# Patient Record
Sex: FEMALE | Race: OTHER | Employment: UNEMPLOYED | URBAN - METROPOLITAN AREA
[De-identification: names, ages, dates, MRNs, and addresses within clinical notes are randomized per-mention and may not be internally consistent; named-entity substitution may affect disease eponyms.]

---

## 2022-10-20 ENCOUNTER — OFFICE VISIT (OUTPATIENT)
Dept: URGENT CARE | Age: 42
End: 2022-10-20

## 2022-10-20 VITALS
HEART RATE: 88 BPM | BODY MASS INDEX: 24.8 KG/M2 | WEIGHT: 140 LBS | HEIGHT: 63 IN | TEMPERATURE: 98.5 F | RESPIRATION RATE: 16 BRPM | OXYGEN SATURATION: 100 % | SYSTOLIC BLOOD PRESSURE: 140 MMHG | DIASTOLIC BLOOD PRESSURE: 83 MMHG

## 2022-10-20 DIAGNOSIS — R22.0 SWELLING OF UPPER LIP: Primary | ICD-10-CM

## 2022-10-20 PROCEDURE — 99203 OFFICE O/P NEW LOW 30 MIN: CPT | Performed by: FAMILY MEDICINE

## 2022-10-20 RX ORDER — PREDNISONE 20 MG/1
60 TABLET ORAL
Qty: 3 TABLET | Refills: 0 | Status: SHIPPED | COMMUNITY
Start: 2022-10-20 | End: 2022-10-20

## 2022-10-20 RX ORDER — PREDNISONE 5 MG/1
TABLET ORAL
Qty: 21 TABLET | Refills: 0 | Status: SHIPPED | OUTPATIENT
Start: 2022-10-21

## 2022-10-20 NOTE — PROGRESS NOTES
Edil Williamson is a 38 y.o. female who presents with upper lip swelling that started 2 hr PTA. Denies new foods, medications. Does not take medication daily. Denies SOB, throat/tongue swelling. The history is provided by the patient. History reviewed. No pertinent past medical history. History reviewed. No pertinent surgical history. History reviewed. No pertinent family history. Social History     Socioeconomic History    Marital status: SINGLE     Spouse name: Not on file    Number of children: Not on file    Years of education: Not on file    Highest education level: Not on file   Occupational History    Not on file   Tobacco Use    Smoking status: Never    Smokeless tobacco: Not on file   Substance and Sexual Activity    Alcohol use: Not on file    Drug use: Not on file    Sexual activity: Not on file   Other Topics Concern    Not on file   Social History Narrative    Not on file     Social Determinants of Health     Financial Resource Strain: Not on file   Food Insecurity: Not on file   Transportation Needs: Not on file   Physical Activity: Not on file   Stress: Not on file   Social Connections: Not on file   Intimate Partner Violence: Not on file   Housing Stability: Not on file                ALLERGIES: Patient has no known allergies. Review of Systems   Constitutional:  Negative for activity change, appetite change and fever. HENT:  Positive for facial swelling. Negative for sore throat. Respiratory:  Negative for chest tightness, shortness of breath and wheezing. Gastrointestinal:  Negative for diarrhea, nausea and vomiting. Vitals:    10/20/22 1541 10/20/22 1635   BP: (!) 166/91 (!) 140/83   Pulse: 88    Resp: 16    Temp: 98.5 °F (36.9 °C)    SpO2: 100%    Weight: 140 lb (63.5 kg)    Height: 5' 3\" (1.6 m)        Physical Exam  Vitals and nursing note reviewed. Constitutional:       General: She is not in acute distress. Appearance: She is well-developed.  She is not diaphoretic. HENT:      Mouth/Throat:      Mouth: Mucous membranes are moist.      Tongue: No lesions. Pharynx: Oropharynx is clear. No oropharyngeal exudate or posterior oropharyngeal erythema. Comments: Upper lip swelling: non-ttp    No tongue swelling  Cardiovascular:      Rate and Rhythm: Normal rate and regular rhythm. Heart sounds: Normal heart sounds. Pulmonary:      Effort: Pulmonary effort is normal. No respiratory distress. Breath sounds: Normal breath sounds. No stridor. No wheezing, rhonchi or rales. Neurological:      Mental Status: She is alert. Psychiatric:         Behavior: Behavior normal.         Thought Content: Thought content normal.         Judgment: Judgment normal.       MDM    ICD-10-CM ICD-9-CM   1. Swelling of upper lip  R22.0 784.2       Orders Placed This Encounter    predniSONE (DELTASONE) 20 mg tablet     Sig: Take 3 Tablets by mouth now for 1 dose. Dispense:  3 Tablet     Refill:  0     Order Specific Question:   Expiration Date     Answer:   11/30/2023     Order Specific Question:   Lot#     Answer:   [id number]     Order Specific Question:        Answer:   Ibeth     Order Specific Question:   NDC#     Answer:   21621-515-15    predniSONE (STERAPRED) 5 mg dose pack     Sig: See administration instruction per 5mg dose pack     Dispense:  21 Tablet     Refill:  0      Unknown etiology  Given prednisone 60mg now  Start prednisone taper tomorrow  The patient is to follow up with PCP. If signs and symptoms become worse the pt is to go to the ER.           Procedures